# Patient Record
Sex: FEMALE | Race: WHITE | NOT HISPANIC OR LATINO
[De-identification: names, ages, dates, MRNs, and addresses within clinical notes are randomized per-mention and may not be internally consistent; named-entity substitution may affect disease eponyms.]

---

## 2018-04-09 ENCOUNTER — APPOINTMENT (OUTPATIENT)
Dept: ENDOCRINOLOGY | Facility: CLINIC | Age: 33
End: 2018-04-09
Payer: MEDICAID

## 2018-04-09 VITALS
SYSTOLIC BLOOD PRESSURE: 110 MMHG | HEART RATE: 94 BPM | BODY MASS INDEX: 23.07 KG/M2 | WEIGHT: 147 LBS | DIASTOLIC BLOOD PRESSURE: 80 MMHG | HEIGHT: 67 IN

## 2018-04-09 PROCEDURE — 99205 OFFICE O/P NEW HI 60 MIN: CPT

## 2018-04-24 ENCOUNTER — APPOINTMENT (OUTPATIENT)
Dept: ENDOCRINOLOGY | Facility: CLINIC | Age: 33
End: 2018-04-24

## 2018-05-08 ENCOUNTER — APPOINTMENT (OUTPATIENT)
Dept: ENDOCRINOLOGY | Facility: CLINIC | Age: 33
End: 2018-05-08
Payer: MEDICAID

## 2018-05-08 DIAGNOSIS — Z79.52 LONG TERM (CURRENT) USE OF SYSTEMIC STEROIDS: ICD-10-CM

## 2018-05-08 PROCEDURE — 76536 US EXAM OF HEAD AND NECK: CPT

## 2018-05-08 PROCEDURE — 99214 OFFICE O/P EST MOD 30 MIN: CPT | Mod: 25

## 2018-05-08 RX ORDER — PREDNISONE 5 MG/1
5 TABLET ORAL
Qty: 90 | Refills: 1 | Status: COMPLETED | COMMUNITY
Start: 2018-04-09 | End: 2018-05-08

## 2018-05-16 ENCOUNTER — APPOINTMENT (OUTPATIENT)
Dept: OTOLARYNGOLOGY | Facility: CLINIC | Age: 33
End: 2018-05-16
Payer: MEDICAID

## 2018-05-16 VITALS
RESPIRATION RATE: 16 BRPM | HEART RATE: 47 BPM | BODY MASS INDEX: 23.54 KG/M2 | SYSTOLIC BLOOD PRESSURE: 109 MMHG | HEIGHT: 67 IN | TEMPERATURE: 98.5 F | DIASTOLIC BLOOD PRESSURE: 69 MMHG | OXYGEN SATURATION: 100 % | WEIGHT: 150 LBS

## 2018-05-16 DIAGNOSIS — Z86.39 PERSONAL HISTORY OF OTHER ENDOCRINE, NUTRITIONAL AND METABOLIC DISEASE: ICD-10-CM

## 2018-05-16 DIAGNOSIS — Z78.9 OTHER SPECIFIED HEALTH STATUS: ICD-10-CM

## 2018-05-16 DIAGNOSIS — E04.2 NONTOXIC MULTINODULAR GOITER: ICD-10-CM

## 2018-05-16 PROCEDURE — 31575 DIAGNOSTIC LARYNGOSCOPY: CPT

## 2018-05-16 PROCEDURE — 99245 OFF/OP CONSLTJ NEW/EST HI 55: CPT | Mod: 25

## 2018-06-19 ENCOUNTER — APPOINTMENT (OUTPATIENT)
Dept: OTOLARYNGOLOGY | Facility: CLINIC | Age: 33
End: 2018-06-19
Payer: MEDICAID

## 2018-06-19 DIAGNOSIS — E05.00 THYROTOXICOSIS WITH DIFFUSE GOITER W/OUT THYROTOXIC CRISIS OR STORM: ICD-10-CM

## 2018-06-19 PROCEDURE — 99215 OFFICE O/P EST HI 40 MIN: CPT

## 2018-06-28 RX ORDER — PROPRANOLOL HCL 160 MG
0 CAPSULE, EXTENDED RELEASE 24HR ORAL
Qty: 0 | Refills: 0 | COMMUNITY

## 2018-06-28 RX ORDER — METHIMAZOLE 10 MG/1
0 TABLET ORAL
Qty: 0 | Refills: 0 | COMMUNITY

## 2018-06-29 ENCOUNTER — INPATIENT (INPATIENT)
Facility: HOSPITAL | Age: 33
LOS: 0 days | Discharge: HOME | DRG: 627 | End: 2018-06-30
Attending: OTOLARYNGOLOGY | Admitting: OTOLARYNGOLOGY
Payer: COMMERCIAL

## 2018-06-29 ENCOUNTER — APPOINTMENT (OUTPATIENT)
Dept: OTOLARYNGOLOGY | Facility: HOSPITAL | Age: 33
End: 2018-06-29

## 2018-06-29 ENCOUNTER — APPOINTMENT (OUTPATIENT)
Dept: OTOLARYNGOLOGY | Facility: AMBULATORY SURGERY CENTER | Age: 33
End: 2018-06-29

## 2018-06-29 ENCOUNTER — RESULT REVIEW (OUTPATIENT)
Age: 33
End: 2018-06-29

## 2018-06-29 VITALS
TEMPERATURE: 97 F | SYSTOLIC BLOOD PRESSURE: 106 MMHG | DIASTOLIC BLOOD PRESSURE: 58 MMHG | OXYGEN SATURATION: 100 % | HEART RATE: 67 BPM | HEIGHT: 67 IN | RESPIRATION RATE: 16 BRPM | WEIGHT: 144.62 LBS

## 2018-06-29 LAB
CALCIUM SERPL-MCNC: 8 MG/DL — LOW (ref 8.4–10.5)
PTH-INTACT IO % DIF SERPL: 18.6 PG/ML — SIGNIFICANT CHANGE UP (ref 8.5–72.5)
PTH-INTACT IO % DIF SERPL: 55.8 PG/ML — SIGNIFICANT CHANGE UP (ref 8.5–72.5)

## 2018-06-29 PROCEDURE — 60240 REMOVAL OF THYROID: CPT | Mod: GC

## 2018-06-29 RX ORDER — ACETAMINOPHEN 500 MG
650 TABLET ORAL EVERY 6 HOURS
Qty: 0 | Refills: 0 | Status: DISCONTINUED | OUTPATIENT
Start: 2018-06-29 | End: 2018-06-30

## 2018-06-29 RX ORDER — OXYCODONE HYDROCHLORIDE 5 MG/1
5 TABLET ORAL EVERY 4 HOURS
Qty: 0 | Refills: 0 | Status: DISCONTINUED | OUTPATIENT
Start: 2018-06-29 | End: 2018-06-30

## 2018-06-29 RX ORDER — CALCITRIOL 0.5 UG/1
0.5 CAPSULE ORAL DAILY
Qty: 0 | Refills: 0 | Status: DISCONTINUED | OUTPATIENT
Start: 2018-06-29 | End: 2018-06-30

## 2018-06-29 RX ORDER — CEFAZOLIN SODIUM 1 G
1000 VIAL (EA) INJECTION EVERY 8 HOURS
Qty: 0 | Refills: 0 | Status: COMPLETED | OUTPATIENT
Start: 2018-06-29 | End: 2018-06-30

## 2018-06-29 RX ORDER — SODIUM CHLORIDE 9 MG/ML
1000 INJECTION, SOLUTION INTRAVENOUS
Qty: 0 | Refills: 0 | Status: DISCONTINUED | OUTPATIENT
Start: 2018-06-29 | End: 2018-06-30

## 2018-06-29 RX ORDER — ONDANSETRON 8 MG/1
4 TABLET, FILM COATED ORAL EVERY 4 HOURS
Qty: 0 | Refills: 0 | Status: DISCONTINUED | OUTPATIENT
Start: 2018-06-29 | End: 2018-06-30

## 2018-06-29 RX ORDER — MORPHINE SULFATE 50 MG/1
2 CAPSULE, EXTENDED RELEASE ORAL
Qty: 0 | Refills: 0 | Status: DISCONTINUED | OUTPATIENT
Start: 2018-06-29 | End: 2018-06-30

## 2018-06-29 RX ORDER — CALCIUM CARBONATE 500(1250)
2 TABLET ORAL EVERY 12 HOURS
Qty: 0 | Refills: 0 | Status: DISCONTINUED | OUTPATIENT
Start: 2018-06-29 | End: 2018-06-30

## 2018-06-29 RX ADMIN — Medication 100 MILLIGRAM(S): at 20:00

## 2018-06-29 RX ADMIN — Medication 2 TABLET(S): at 19:55

## 2018-06-29 RX ADMIN — CALCITRIOL 0.5 MICROGRAM(S): 0.5 CAPSULE ORAL at 19:55

## 2018-06-29 NOTE — H&P PST ADULT - HISTORY OF PRESENT ILLNESS
HPI: 32F with hyperthyroidism s/p total thyroidectomy.    PMH: hyperthyroidism  NKDA    Interval: Extubated in OR and transferred to PACU in stable condition. Pain controlled.    PE:  NAD  Breathing comfortably on RA  Voice strong  Neck incision c/d/i, flat  Neck drain w/ blood/serosang

## 2018-06-29 NOTE — BRIEF OPERATIVE NOTE - PROCEDURE
<<-----Click on this checkbox to enter Procedure Total thyroidectomy  06/29/2018    Active  BSANDERS

## 2018-06-29 NOTE — H&P PST ADULT - ASSESSMENT
A/P: 32F with hyperthyroidism s/p total thyroidectomy 6/29.  - Pain control  - Ancef  - F/u drain output  - Synthroid  - F/u postop PTH  - PM and AM calcium  - SCDs, OOB  - Soft diet, IVF  - Dispo: Regional room    Seen and discussed with attending

## 2018-06-30 ENCOUNTER — TRANSCRIPTION ENCOUNTER (OUTPATIENT)
Age: 33
End: 2018-06-30

## 2018-06-30 VITALS
TEMPERATURE: 99 F | SYSTOLIC BLOOD PRESSURE: 100 MMHG | RESPIRATION RATE: 17 BRPM | OXYGEN SATURATION: 99 % | DIASTOLIC BLOOD PRESSURE: 52 MMHG | HEART RATE: 81 BPM

## 2018-06-30 LAB
ALBUMIN SERPL ELPH-MCNC: 3.7 G/DL — SIGNIFICANT CHANGE UP (ref 3.3–5)
CALCIUM SERPL-MCNC: 8 MG/DL — LOW (ref 8.4–10.5)
CALCIUM SERPL-MCNC: 8.1 MG/DL — LOW (ref 8.4–10.5)

## 2018-06-30 RX ORDER — CALCITRIOL 0.5 UG/1
1 CAPSULE ORAL
Qty: 30 | Refills: 0 | OUTPATIENT
Start: 2018-06-30 | End: 2018-07-29

## 2018-06-30 RX ORDER — CALCIUM CARBONATE 500(1250)
2 TABLET ORAL
Qty: 120 | Refills: 0 | OUTPATIENT
Start: 2018-06-30 | End: 2018-07-29

## 2018-06-30 RX ADMIN — Medication 100 MILLIGRAM(S): at 04:46

## 2018-06-30 RX ADMIN — CALCITRIOL 0.5 MICROGRAM(S): 0.5 CAPSULE ORAL at 12:35

## 2018-06-30 RX ADMIN — Medication 2 TABLET(S): at 07:29

## 2018-06-30 RX ADMIN — Medication 100 MILLIGRAM(S): at 12:39

## 2018-06-30 NOTE — DISCHARGE NOTE ADULT - PATIENT PORTAL LINK FT
You can access the [facility] Patient Portal, offered by [facility], by registering with the following website: [email or web address]

## 2018-06-30 NOTE — DISCHARGE NOTE ADULT - HOSPITAL COURSE
32F admitted for observation and recovery s/p thyroidectomy. No intra/post-op complications. Pt tolerated procedure well. Calcium levels post-op remained stable and pt did not show any signs of hypocalcemia. Ambulating, tolerating diet, vitals signs stable and ready for discharge.

## 2018-06-30 NOTE — DISCHARGE NOTE ADULT - CARE PROVIDER_API CALL
Willy Fernandez, Uche MAYEN (MD), Otolaryngology  [address] [place] [address]  Phone: [phone]  Fax: [phone]

## 2018-06-30 NOTE — DISCHARGE NOTE ADULT - PLAN OF CARE
Return to baseline 1. Report any fever, chills, difficulty breathing, difficulty swallowing, and change in your voice, bleeding  or purulent drainage from your incision sites, or any significant swelling to your neck to the doctor  immediately.  2.  If you have numbness or tingling in your fingers/lips take 2 tums or 2 tablets of calcium supplementation and call your doctor immediately.  3. Diet: soft/mechanical soft diet, no sharp foods, no extensive chewing of steaks or hard meats ect.  You can resume a normal diet once your throat feels back to normal.  4. Activity: no heavy lifting, do not lift anything heavier than a gallon of milk until after you follow up  appointment with your doctor, no strenuous activity such as running or biking.  5. Shower/Bathing: you shower starting 48 hrs after you procedure, after 48 hrs you may wash your  hair and shower but do not scrub at your neck incision  6. Wound care: keep your incision site clean and dry  7. Take all medications as prescribed.  8. Continue all of your normal home medications unless told otherwise.  9. Avoid any NSAIDS or ibuprofen/asa containing products you may take Tylenol for mild pain.

## 2018-06-30 NOTE — DISCHARGE NOTE ADULT - ADDITIONAL INSTRUCTIONS
Follow up with Dr. Aguilera after graduation. Call his office after your discharge to make an appointment. Plan to see him within 1 week of your discharge.

## 2018-06-30 NOTE — DISCHARGE NOTE ADULT - MEDICATION SUMMARY - MEDICATIONS TO TAKE
I will START or STAY ON the medications listed below when I get home from the hospital:    predniSONE 10 mg oral tablet  -- 1 tab(s) by mouth once a day  -- Indication: For No significant past surgical history    calcium carbonate 1250 mg (500 mg elemental calcium) oral tablet  -- 2 tab(s) by mouth every 12 hours  -- Indication: For Thyroid disease    propranolol 20 mg oral tablet  -- orally once a day  -- Indication: For No significant past surgical history    methIMAzole 10 mg oral tablet  -- orally once a day  -- Indication: For No significant past surgical history    calcitriol 0.5 mcg oral capsule  -- 1 cap(s) by mouth once a day  -- Indication: For Thyroid disease

## 2018-06-30 NOTE — DISCHARGE NOTE ADULT - CARE PLAN
Principal Discharge DX:	Thyroid disease  Goal:	Return to baseline  Assessment and plan of treatment:	1. Report any fever, chills, difficulty breathing, difficulty swallowing, and change in your voice, bleeding  or purulent drainage from your incision sites, or any significant swelling to your neck to the doctor  immediately.  2.  If you have numbness or tingling in your fingers/lips take 2 tums or 2 tablets of calcium supplementation and call your doctor immediately.  3. Diet: soft/mechanical soft diet, no sharp foods, no extensive chewing of steaks or hard meats ect.  You can resume a normal diet once your throat feels back to normal.  4. Activity: no heavy lifting, do not lift anything heavier than a gallon of milk until after you follow up  appointment with your doctor, no strenuous activity such as running or biking.  5. Shower/Bathing: you shower starting 48 hrs after you procedure, after 48 hrs you may wash your  hair and shower but do not scrub at your neck incision  6. Wound care: keep your incision site clean and dry  7. Take all medications as prescribed.  8. Continue all of your normal home medications unless told otherwise.  9. Avoid any NSAIDS or ibuprofen/asa containing products you may take Tylenol for mild pain.

## 2018-07-02 ENCOUNTER — MOBILE ON CALL (OUTPATIENT)
Age: 33
End: 2018-07-02

## 2018-07-02 PROBLEM — E07.9 DISORDER OF THYROID, UNSPECIFIED: Chronic | Status: ACTIVE | Noted: 2018-06-28

## 2018-07-03 PROCEDURE — 83970 ASSAY OF PARATHORMONE: CPT

## 2018-07-03 PROCEDURE — 86850 RBC ANTIBODY SCREEN: CPT

## 2018-07-03 PROCEDURE — 88307 TISSUE EXAM BY PATHOLOGIST: CPT

## 2018-07-03 PROCEDURE — 82310 ASSAY OF CALCIUM: CPT

## 2018-07-03 PROCEDURE — 86901 BLOOD TYPING SEROLOGIC RH(D): CPT

## 2018-07-03 PROCEDURE — 86900 BLOOD TYPING SEROLOGIC ABO: CPT

## 2018-07-03 PROCEDURE — 36415 COLL VENOUS BLD VENIPUNCTURE: CPT

## 2018-07-03 PROCEDURE — 82040 ASSAY OF SERUM ALBUMIN: CPT

## 2018-07-05 DIAGNOSIS — E05.00 THYROTOXICOSIS WITH DIFFUSE GOITER WITHOUT THYROTOXIC CRISIS OR STORM: ICD-10-CM

## 2018-07-05 DIAGNOSIS — E05.90 THYROTOXICOSIS, UNSPECIFIED WITHOUT THYROTOXIC CRISIS OR STORM: ICD-10-CM

## 2018-07-05 LAB — SURGICAL PATHOLOGY STUDY: SIGNIFICANT CHANGE UP

## 2018-07-24 ENCOUNTER — APPOINTMENT (OUTPATIENT)
Dept: OTOLARYNGOLOGY | Facility: CLINIC | Age: 33
End: 2018-07-24

## 2018-08-08 ENCOUNTER — APPOINTMENT (OUTPATIENT)
Dept: OTOLARYNGOLOGY | Facility: CLINIC | Age: 33
End: 2018-08-08

## 2018-08-28 ENCOUNTER — APPOINTMENT (OUTPATIENT)
Dept: ENDOCRINOLOGY | Facility: CLINIC | Age: 33
End: 2018-08-28

## 2018-09-26 ENCOUNTER — APPOINTMENT (OUTPATIENT)
Dept: OTOLARYNGOLOGY | Facility: CLINIC | Age: 33
End: 2018-09-26
Payer: MEDICAID

## 2018-09-26 VITALS
WEIGHT: 150 LBS | SYSTOLIC BLOOD PRESSURE: 114 MMHG | HEART RATE: 89 BPM | BODY MASS INDEX: 23.54 KG/M2 | DIASTOLIC BLOOD PRESSURE: 62 MMHG | HEIGHT: 67 IN | TEMPERATURE: 98.8 F | OXYGEN SATURATION: 93 %

## 2018-09-26 PROCEDURE — 99024 POSTOP FOLLOW-UP VISIT: CPT

## 2018-09-26 PROCEDURE — 11900 INJECT SKIN LESIONS </W 7: CPT | Mod: 58

## 2018-09-26 RX ORDER — BACITRACIN 500 [IU]/G
500 OINTMENT TOPICAL
Qty: 1 | Refills: 0 | Status: DISCONTINUED | COMMUNITY
Start: 2018-06-19 | End: 2018-09-26

## 2018-09-26 RX ORDER — PREDNISONE 10 MG/1
10 TABLET ORAL DAILY
Qty: 90 | Refills: 0 | Status: DISCONTINUED | COMMUNITY
End: 2018-09-26

## 2018-09-26 RX ORDER — HYDROCODONE BITARTRATE AND ACETAMINOPHEN 5; 325 MG/1; MG/1
5-325 TABLET ORAL
Qty: 30 | Refills: 0 | Status: DISCONTINUED | COMMUNITY
Start: 2018-06-19 | End: 2018-09-26

## 2018-09-26 RX ORDER — METHIMAZOLE 10 MG/1
10 TABLET ORAL
Refills: 0 | Status: DISCONTINUED | COMMUNITY
End: 2018-09-26

## 2018-09-26 RX ORDER — PROPRANOLOL HYDROCHLORIDE 40 MG/1
40 TABLET ORAL
Refills: 0 | Status: DISCONTINUED | COMMUNITY
End: 2018-09-26

## 2018-09-26 RX ORDER — POTASSIUM IODIDE 1 G/ML
1 SOLUTION ORAL
Qty: 1 | Refills: 0 | Status: DISCONTINUED | COMMUNITY
Start: 2018-06-19 | End: 2018-09-26

## 2018-11-01 ENCOUNTER — RX RENEWAL (OUTPATIENT)
Age: 33
End: 2018-11-01

## 2018-11-06 ENCOUNTER — APPOINTMENT (OUTPATIENT)
Dept: OTOLARYNGOLOGY | Facility: CLINIC | Age: 33
End: 2018-11-06

## 2018-12-10 ENCOUNTER — APPOINTMENT (OUTPATIENT)
Dept: ENDOCRINOLOGY | Facility: CLINIC | Age: 33
End: 2018-12-10

## 2019-01-24 ENCOUNTER — RX RENEWAL (OUTPATIENT)
Age: 34
End: 2019-01-24

## 2019-04-09 ENCOUNTER — RX RENEWAL (OUTPATIENT)
Age: 34
End: 2019-04-09

## 2019-05-13 ENCOUNTER — RX RENEWAL (OUTPATIENT)
Age: 34
End: 2019-05-13

## 2019-07-16 ENCOUNTER — APPOINTMENT (OUTPATIENT)
Dept: ENDOCRINOLOGY | Facility: CLINIC | Age: 34
End: 2019-07-16

## 2019-08-08 ENCOUNTER — APPOINTMENT (OUTPATIENT)
Dept: ENDOCRINOLOGY | Facility: CLINIC | Age: 34
End: 2019-08-08
Payer: COMMERCIAL

## 2019-08-08 VITALS
DIASTOLIC BLOOD PRESSURE: 74 MMHG | HEART RATE: 68 BPM | SYSTOLIC BLOOD PRESSURE: 110 MMHG | WEIGHT: 146 LBS | BODY MASS INDEX: 22.87 KG/M2

## 2019-08-08 PROCEDURE — 99213 OFFICE O/P EST LOW 20 MIN: CPT

## 2019-08-12 NOTE — PAST MEDICAL HISTORY
[Menstruating] : The patient is menstruating [History of Hormone Replacement Treatment] : has no history of hormone replacement treatment

## 2019-08-12 NOTE — ASSESSMENT
[FreeTextEntry1] : Graves disease\par Now appears euthyroid. after Surgery. We will continue weight based therapy w/ LT4, instructed to inform us if pregnancy occurs for dose adjustment. Verbalized understanding and agrees with treatment plan, will contact MD and seek emergency medical care if condition changes, we reviewed the sign/symptoms or worsening thyrotoxicosis and thyroid storm.\par \par

## 2019-08-12 NOTE — HISTORY OF PRESENT ILLNESS
[FreeTextEntry1] : Hx of Graves disease with ophthalmopathy, diagnosed in 5/2017 in the [place]\par Has been taking Prednisone since 5/2017 20 mg, now down to 10 mg since 1/2018\par Has been taking MTX since 5/2017, now down from 30 to 20 mg daily since 1/2018.\par Original symptoms were proptosis w/ conjunctival erythema, tremors, anxiety, palpitations, weight loss and heat intolerance. At present, \par \par 8/2019: Here for /fu, generally feels well and endorses no acute complaints. No interval events since LV. Today comes for 1st post op evaluation, adherent to LT4 as instructed. adequate energy and weight reported.\par She otherwise denies any f/c, CP, SOB, palpitations, tremors, depressed mood, anxiety, palpitations, n/v, stool/urinary abn, skin/weight changes, heat/cold intolerance, HAs, breast/nipple changes, polyuria/polydipsia/nocturia or other complaints.\par She denies any dysphagia, hoarseness, neck tenderness or new palpable masses. She denies any family history of thyroid disorders or personal exposure to ionizing radiation.\par \par \par

## 2019-08-12 NOTE — PROCEDURE
[FreeTextEntry1] : POC US of the Neck:\par \par Indication: History of Graves disease\par \par Comparison: N/A\par \par Findings:\par \par The thyroid parenchyma is heterogenous, mostly isoechoic and exhibits moderate vascularity throughout the gland.\par \par The right thyroid lobe measure 6 x 2 x 1.8 and exhibits no discernible nodules\par \par The isthmus measures 0.8 cm and exhibits no discernible nodules.\par \par The left thyroid lobe measures 5.7 x 1.9 x 2 cm and exhibits no discernible nodules.\par \par There are no distinct parathyroids identified on this examination.\par \par There are no pathologic lymph nodes appreciated on bilateral neck examination.\par \par Impression:\par Enlarged gland with features consistent of Graves disease, without nodularity.

## 2019-08-12 NOTE — PHYSICAL EXAM
[Alert] : alert [No Acute Distress] : no acute distress [Well Developed] : well developed [Well Nourished] : well nourished [Normal Sclera/Conjunctiva] : normal sclera/conjunctiva [PERRL] : pupils equal, round and reactive to light [Visual Fields Grossly Intact] : visual fields grossly intact [EOMI] : extra ocular movement intact [Normal Oropharynx] : the oropharynx was normal [No Lid Lag] : no lid lag [No LAD] : no lymphadenopathy [No Accessory Muscle Use] : no accessory muscle use [No Respiratory Distress] : no respiratory distress [Clear to Auscultation] : lungs were clear to auscultation bilaterally [Normal Rate] : heart rate was normal  [Regular Rhythm] : with a regular rhythm [Normal S1, S2] : normal S1 and S2 [Pedal Pulses Normal] : the pedal pulses are present [No Edema] : there was no peripheral edema [Normal Bowel Sounds] : normal bowel sounds [Not Tender] : non-tender [Soft] : abdomen soft [Not Distended] : not distended [Axillary Nodes] : axillary nodes [Anterior Cervical Nodes] : anterior cervical nodes [Post Cervical Nodes] : posterior cervical nodes [Normal] : normal and non tender [No Spinal Tenderness] : no spinal tenderness [Spine Straight] : spine straight [No Stigmata of Cushings Syndrome] : no stigmata of cushings syndrome [Normal Gait] : normal gait [Normal Strength/Tone] : muscle strength and tone were normal [No Rash] : no rash [Acanthosis Nigricans] : no acanthosis nigricans [Normal Reflexes] : deep tendon reflexes were 2+ and symmetric [No Tremors] : no tremors [Oriented x3] : oriented to person, place, and time [de-identified] : Mild proptosis [de-identified] : + bruit, mod non tender goiter of ~ 50 gm

## 2019-08-15 LAB
T4 FREE SERPL-MCNC: 0.3 NG/DL
TSH SERPL-ACNC: 81.3 UIU/ML

## 2020-08-12 ENCOUNTER — RX RENEWAL (OUTPATIENT)
Age: 35
End: 2020-08-12

## 2020-09-03 ENCOUNTER — APPOINTMENT (OUTPATIENT)
Dept: ENDOCRINOLOGY | Facility: CLINIC | Age: 35
End: 2020-09-03

## 2020-09-16 ENCOUNTER — RX RENEWAL (OUTPATIENT)
Age: 35
End: 2020-09-16

## 2020-09-23 ENCOUNTER — APPOINTMENT (OUTPATIENT)
Dept: ENDOCRINOLOGY | Facility: CLINIC | Age: 35
End: 2020-09-23
Payer: COMMERCIAL

## 2020-09-23 VITALS
HEART RATE: 48 BPM | BODY MASS INDEX: 24.48 KG/M2 | SYSTOLIC BLOOD PRESSURE: 96 MMHG | HEIGHT: 67 IN | DIASTOLIC BLOOD PRESSURE: 77 MMHG | WEIGHT: 156 LBS

## 2020-09-23 DIAGNOSIS — Z31.69 ENCOUNTER FOR OTHER GENERAL COUNSELING AND ADVICE ON PROCREATION: ICD-10-CM

## 2020-09-23 DIAGNOSIS — Z00.00 ENCOUNTER FOR GENERAL ADULT MEDICAL EXAMINATION W/OUT ABNORMAL FINDINGS: ICD-10-CM

## 2020-09-23 PROCEDURE — 99214 OFFICE O/P EST MOD 30 MIN: CPT

## 2020-09-27 NOTE — ASSESSMENT
[FreeTextEntry1] : acquired hypothyroidism\par Now appears euthyroid. after Surgery. We will continue weight based therapy w/ LT4, instructed to inform us if pregnancy occurs for dose adjustment. Verbalized understanding and agrees with treatment plan, will contact MD and seek emergency medical care if condition changes, we reviewed the sign/symptoms or worsening thyrotoxicosis and thyroid storm.\par \par

## 2020-09-27 NOTE — HISTORY OF PRESENT ILLNESS
[FreeTextEntry1] : Hx of Graves disease with ophthalmopathy, diagnosed in 5/2017 in the [place]\par Has been taking Prednisone since 5/2017 20 mg, now down to 10 mg since 1/2018\par Has been taking MTX since 5/2017, now down from 30 to 20 mg daily since 1/2018.\par Original symptoms were proptosis w/ conjunctival erythema, tremors, anxiety, palpitations, weight loss and heat intolerance. At present, \par \par 9/2020: Here to re establish care, lost to f/u x 1 year, generally feels well and endorses no acute complaints. No interval events since LV. Today comes for post op evaluation, adherent to LT4 as instructed. adequate energy and weight reported.\par She otherwise denies any f/c, CP, SOB, palpitations, tremors, depressed mood, anxiety, palpitations, n/v, stool/urinary abn, skin/weight changes, heat/cold intolerance, HAs, breast/nipple changes, polyuria/polydipsia/nocturia or other complaints.\par She denies any dysphagia, hoarseness, neck tenderness or new palpable masses. She denies any family history of thyroid disorders or personal exposure to ionizing radiation.\par \par \par

## 2020-09-29 LAB
25(OH)D3 SERPL-MCNC: 19.9 NG/ML
T3 SERPL-MCNC: 50 NG/DL
T4 FREE SERPL-MCNC: 0.7 NG/DL
TSH SERPL-ACNC: 25.3 UIU/ML

## 2020-10-20 ENCOUNTER — APPOINTMENT (OUTPATIENT)
Dept: ENDOCRINOLOGY | Facility: CLINIC | Age: 35
End: 2020-10-20

## 2020-12-30 ENCOUNTER — RX RENEWAL (OUTPATIENT)
Age: 35
End: 2020-12-30

## 2021-03-30 ENCOUNTER — APPOINTMENT (OUTPATIENT)
Dept: ENDOCRINOLOGY | Facility: CLINIC | Age: 36
End: 2021-03-30
Payer: COMMERCIAL

## 2021-03-30 VITALS
BODY MASS INDEX: 25.11 KG/M2 | HEIGHT: 67 IN | WEIGHT: 160 LBS | HEART RATE: 65 BPM | DIASTOLIC BLOOD PRESSURE: 63 MMHG | SYSTOLIC BLOOD PRESSURE: 105 MMHG

## 2021-03-30 PROCEDURE — 99072 ADDL SUPL MATRL&STAF TM PHE: CPT

## 2021-03-30 PROCEDURE — 99213 OFFICE O/P EST LOW 20 MIN: CPT

## 2021-04-01 NOTE — HISTORY OF PRESENT ILLNESS
[FreeTextEntry1] : Hx of Graves disease with ophthalmopathy, diagnosed in 5/2017 in the [place]\par Has been taking Prednisone since 5/2017 20 mg, now down to 10 mg since 1/2018\par Has been taking MTX since 5/2017, now down from 30 to 20 mg daily since 1/2018.\par Original symptoms were proptosis w/ conjunctival erythema, tremors, anxiety, palpitations, weight loss and heat intolerance. At present, \par \par 3/2021: Here for f/u, no interval events, generally feels well and endorses no acute complaints. No interval events since LV. Today comes for post op evaluation, adherent to LT4 as instructed. adequate energy and weight reported.\par She otherwise denies any f/c, CP, SOB, palpitations, tremors, depressed mood, anxiety, palpitations, n/v, stool/urinary abn, skin/weight changes, heat/cold intolerance, HAs, breast/nipple changes, polyuria/polydipsia/nocturia or other complaints.\par She denies any dysphagia, hoarseness, neck tenderness or new palpable masses. She denies any family history of thyroid disorders or personal exposure to ionizing radiation.\par \par \par

## 2021-04-02 LAB
T3 SERPL-MCNC: 71 NG/DL
T4 FREE SERPL-MCNC: 1 NG/DL
TSH SERPL-ACNC: 12.3 UIU/ML

## 2021-05-26 ENCOUNTER — APPOINTMENT (OUTPATIENT)
Dept: ENDOCRINOLOGY | Facility: CLINIC | Age: 36
End: 2021-05-26

## 2021-10-19 ENCOUNTER — RX RENEWAL (OUTPATIENT)
Age: 36
End: 2021-10-19

## 2021-11-19 ENCOUNTER — APPOINTMENT (OUTPATIENT)
Dept: ENDOCRINOLOGY | Facility: CLINIC | Age: 36
End: 2021-11-19

## 2021-12-17 ENCOUNTER — APPOINTMENT (OUTPATIENT)
Dept: ENDOCRINOLOGY | Facility: CLINIC | Age: 36
End: 2021-12-17
Payer: COMMERCIAL

## 2021-12-17 VITALS
HEART RATE: 81 BPM | BODY MASS INDEX: 24.64 KG/M2 | WEIGHT: 157 LBS | HEIGHT: 67 IN | DIASTOLIC BLOOD PRESSURE: 72 MMHG | SYSTOLIC BLOOD PRESSURE: 112 MMHG

## 2021-12-17 PROCEDURE — 99214 OFFICE O/P EST MOD 30 MIN: CPT

## 2021-12-17 PROCEDURE — 99072 ADDL SUPL MATRL&STAF TM PHE: CPT

## 2021-12-17 RX ORDER — LEVOTHYROXINE SODIUM 0.11 MG/1
112 TABLET ORAL
Qty: 90 | Refills: 0 | Status: COMPLETED | COMMUNITY
Start: 2021-10-19 | End: 2021-12-17

## 2021-12-19 NOTE — HISTORY OF PRESENT ILLNESS
[FreeTextEntry1] : Hx of Graves disease with ophthalmopathy, diagnosed in 5/2017 in the [place]\par Has been taking Prednisone since 5/2017 20 mg, now down to 10 mg since 1/2018\par Has been taking MTX since 5/2017, now down from 30 to 20 mg daily since 1/2018.\par Original symptoms were proptosis w/ conjunctival erythema, tremors, anxiety, palpitations, weight loss and heat intolerance. At present, \par \par 12/2021: Here for f/u, no interval events, generally feels well and endorses no acute complaints. No interval events since LV. Today comes for post op evaluation, adherent to LT4 as instructed. adequate energy and weight reported.\par She otherwise denies any f/c, CP, SOB, palpitations, tremors, depressed mood, anxiety, palpitations, n/v, stool/urinary abn, skin/weight changes, heat/cold intolerance, HAs, breast/nipple changes, polyuria/polydipsia/nocturia or other complaints.\par She denies any dysphagia, hoarseness, neck tenderness or new palpable masses. She denies any family history of thyroid disorders or personal exposure to ionizing radiation.\par \par \par

## 2022-08-25 ENCOUNTER — APPOINTMENT (OUTPATIENT)
Dept: ENDOCRINOLOGY | Facility: CLINIC | Age: 37
End: 2022-08-25

## 2022-09-07 ENCOUNTER — APPOINTMENT (OUTPATIENT)
Dept: ENDOCRINOLOGY | Facility: CLINIC | Age: 37
End: 2022-09-07

## 2022-09-07 VITALS
DIASTOLIC BLOOD PRESSURE: 77 MMHG | SYSTOLIC BLOOD PRESSURE: 112 MMHG | BODY MASS INDEX: 24.59 KG/M2 | HEART RATE: 64 BPM | WEIGHT: 157 LBS

## 2022-09-07 DIAGNOSIS — E05.00 THYROTOXICOSIS WITH DIFFUSE GOITER W/OUT THYROTOXIC CRISIS OR STORM: ICD-10-CM

## 2022-09-07 DIAGNOSIS — E55.9 VITAMIN D DEFICIENCY, UNSPECIFIED: ICD-10-CM

## 2022-09-07 PROCEDURE — 99215 OFFICE O/P EST HI 40 MIN: CPT

## 2022-09-07 RX ORDER — CARBOXYMETHYLCELLULOSE SODIUM 10 MG/ML
1 SOLUTION/ DROPS OPHTHALMIC 4 TIMES DAILY
Qty: 48 | Refills: 0 | Status: ACTIVE | COMMUNITY
Start: 2022-09-07 | End: 1900-01-01

## 2022-09-09 NOTE — ASSESSMENT
[FreeTextEntry1] : acquired hypothyroidism\par Now appears euthyroid. after Surgery. We will continue weight based therapy w/ LT4, instructed to inform us if pregnancy occurs for dose adjustment. Verbalized understanding and agrees with treatment plan, will contact MD and seek emergency medical care if condition changes, we reviewed the sign/symptoms or worsening thyrotoxicosis and thyroid storm.\par \par graves ophthalmopathy\par mild disease, non smoker, reviewed s/s of worsening ophthalmopathy, reviewed treatment strategies. start artificial tears and selenium. baseline orbital MRI. advised to contact us if symptoms worsen to discuss steroid/Tepezza initiation.\par

## 2022-09-09 NOTE — HISTORY OF PRESENT ILLNESS
[FreeTextEntry1] : Hx of Graves disease with ophthalmopathy, diagnosed in 5/2017 in the [place]\par Has been taking Prednisone since 5/2017 20 mg, now down to 10 mg since 1/2018\par Has been taking MTX since 5/2017, now down from 30 to 20 mg daily since 1/2018.\par Original symptoms were proptosis w/ conjunctival erythema, tremors, anxiety, palpitations, weight loss and heat intolerance. At present, \par \par 9/2022: Here for f/u, no interval events, generally feels well and endorses no acute complaints. No interval events since LV. Today comes for post op evaluation, adherent to LT4 as instructed. adequate energy and weight reported. reports some eye dryness and foreign body sensation b/l, no proptosis or reduced visual acuity/diplopia reported.\par She otherwise denies any f/c, CP, SOB, palpitations, tremors, depressed mood, anxiety, palpitations, n/v, stool/urinary abn, skin/weight changes, heat/cold intolerance, HAs, breast/nipple changes, polyuria/polydipsia/nocturia or other complaints.\par She denies any dysphagia, hoarseness, neck tenderness or new palpable masses. She denies any family history of thyroid disorders or personal exposure to ionizing radiation.\par \par \par

## 2022-09-09 NOTE — PHYSICAL EXAM
[Alert] : alert [Well Nourished] : well nourished [No Acute Distress] : no acute distress [Well Developed] : well developed [Normal Sclera/Conjunctiva] : normal sclera/conjunctiva [EOMI] : extra ocular movement intact [No Proptosis] : no proptosis [Normal Oropharynx] : the oropharynx was normal [Thyroid Not Enlarged] : the thyroid was not enlarged [No Thyroid Nodules] : no palpable thyroid nodules [No Respiratory Distress] : no respiratory distress [No Accessory Muscle Use] : no accessory muscle use [Clear to Auscultation] : lungs were clear to auscultation bilaterally [Normal S1, S2] : normal S1 and S2 [Normal Rate] : heart rate was normal [Regular Rhythm] : with a regular rhythm [No Edema] : no peripheral edema [Pedal Pulses Normal] : the pedal pulses are present [Normal Bowel Sounds] : normal bowel sounds [Not Tender] : non-tender [Not Distended] : not distended [Soft] : abdomen soft [Normal Anterior Cervical Nodes] : no anterior cervical lymphadenopathy [Normal Posterior Cervical Nodes] : no posterior cervical lymphadenopathy [No Spinal Tenderness] : no spinal tenderness [Spine Straight] : spine straight [No Stigmata of Cushings Syndrome] : no stigmata of Cushings Syndrome [Normal Gait] : normal gait [Normal Strength/Tone] : muscle strength and tone were normal [No Rash] : no rash [Acanthosis Nigricans] : no acanthosis nigricans [Normal Reflexes] : deep tendon reflexes were 2+ and symmetric [No Tremors] : no tremors [Oriented x3] : oriented to person, place, and time [de-identified] : + lid lag and conjunctival erythema

## 2022-09-13 ENCOUNTER — NON-APPOINTMENT (OUTPATIENT)
Age: 37
End: 2022-09-13

## 2022-09-14 LAB
25(OH)D3 SERPL-MCNC: 21.6 NG/ML
ALBUMIN SERPL ELPH-MCNC: 4.1 G/DL
ALP BLD-CCNC: 48 U/L
ALT SERPL-CCNC: 6 U/L
ANION GAP SERPL CALC-SCNC: 10 MMOL/L
AST SERPL-CCNC: 11 U/L
BILIRUB SERPL-MCNC: 0.8 MG/DL
BUN SERPL-MCNC: 11 MG/DL
CALCIUM SERPL-MCNC: 8.7 MG/DL
CHLORIDE SERPL-SCNC: 107 MMOL/L
CO2 SERPL-SCNC: 24 MMOL/L
CREAT SERPL-MCNC: 0.85 MG/DL
EGFR: 91 ML/MIN/1.73M2
GLUCOSE SERPL-MCNC: 107 MG/DL
HCG SERPL-MCNC: <1 MIU/ML
POTASSIUM SERPL-SCNC: 4.1 MMOL/L
PROT SERPL-MCNC: 6.7 G/DL
SODIUM SERPL-SCNC: 141 MMOL/L
T3 SERPL-MCNC: 81 NG/DL
T4 FREE SERPL-MCNC: 1.7 NG/DL
TSH SERPL-ACNC: 2.39 UIU/ML

## 2022-09-29 ENCOUNTER — OUTPATIENT (OUTPATIENT)
Dept: OUTPATIENT SERVICES | Facility: HOSPITAL | Age: 37
LOS: 1 days | End: 2022-09-29
Payer: COMMERCIAL

## 2022-09-29 ENCOUNTER — APPOINTMENT (OUTPATIENT)
Dept: MRI IMAGING | Facility: HOSPITAL | Age: 37
End: 2022-09-29

## 2022-09-29 PROCEDURE — 70543 MRI ORBT/FAC/NCK W/O &W/DYE: CPT | Mod: 26

## 2022-09-29 PROCEDURE — 70543 MRI ORBT/FAC/NCK W/O &W/DYE: CPT

## 2022-09-29 PROCEDURE — A9585: CPT

## 2023-05-10 ENCOUNTER — RX RENEWAL (OUTPATIENT)
Age: 38
End: 2023-05-10

## 2023-06-09 ENCOUNTER — APPOINTMENT (OUTPATIENT)
Dept: ENDOCRINOLOGY | Facility: CLINIC | Age: 38
End: 2023-06-09

## 2023-09-29 ENCOUNTER — APPOINTMENT (OUTPATIENT)
Dept: ENDOCRINOLOGY | Facility: CLINIC | Age: 38
End: 2023-09-29

## 2024-01-17 ENCOUNTER — APPOINTMENT (OUTPATIENT)
Dept: ENDOCRINOLOGY | Facility: CLINIC | Age: 39
End: 2024-01-17
Payer: MEDICAID

## 2024-01-17 VITALS
HEART RATE: 58 BPM | DIASTOLIC BLOOD PRESSURE: 69 MMHG | HEIGHT: 66 IN | SYSTOLIC BLOOD PRESSURE: 111 MMHG | BODY MASS INDEX: 24.59 KG/M2 | WEIGHT: 153 LBS

## 2024-01-17 DIAGNOSIS — E03.9 HYPOTHYROIDISM, UNSPECIFIED: ICD-10-CM

## 2024-01-17 PROCEDURE — 36415 COLL VENOUS BLD VENIPUNCTURE: CPT

## 2024-01-17 PROCEDURE — 99213 OFFICE O/P EST LOW 20 MIN: CPT | Mod: 25

## 2024-01-25 LAB
T3FREE SERPL-MCNC: 1.9 PG/ML
T4 FREE SERPL-MCNC: 1.3 NG/DL
TSH SERPL-ACNC: 0.96 UIU/ML

## 2024-01-25 RX ORDER — LEVOTHYROXINE SODIUM 0.15 MG/1
150 TABLET ORAL
Qty: 90 | Refills: 3 | Status: ACTIVE | COMMUNITY
Start: 2018-09-10 | End: 1900-01-01

## 2025-04-25 ENCOUNTER — NON-APPOINTMENT (OUTPATIENT)
Age: 40
End: 2025-04-25

## 2025-07-18 ENCOUNTER — APPOINTMENT (OUTPATIENT)
Dept: ENDOCRINOLOGY | Facility: CLINIC | Age: 40
End: 2025-07-18